# Patient Record
Sex: FEMALE | Race: AMERICAN INDIAN OR ALASKA NATIVE
[De-identification: names, ages, dates, MRNs, and addresses within clinical notes are randomized per-mention and may not be internally consistent; named-entity substitution may affect disease eponyms.]

---

## 2020-04-29 ENCOUNTER — HOSPITAL ENCOUNTER (INPATIENT)
Dept: HOSPITAL 5 - TRG | Age: 30
LOS: 1 days | Discharge: HOME | DRG: 769 | End: 2020-04-30
Attending: OBSTETRICS & GYNECOLOGY | Admitting: OBSTETRICS & GYNECOLOGY
Payer: MEDICAID

## 2020-04-29 DIAGNOSIS — E66.01: ICD-10-CM

## 2020-04-29 DIAGNOSIS — Z23: ICD-10-CM

## 2020-04-29 DIAGNOSIS — D64.9: ICD-10-CM

## 2020-04-29 DIAGNOSIS — Z3A.21: ICD-10-CM

## 2020-04-29 LAB
BACTERIA #/AREA URNS HPF: (no result) /HPF
BASOPHILS # (AUTO): 0.1 K/MM3 (ref 0–0.1)
BASOPHILS NFR BLD AUTO: 0.4 % (ref 0–1.8)
BILIRUB UR QL STRIP: (no result)
BLOOD UR QL VISUAL: (no result)
EOSINOPHIL # BLD AUTO: 0.1 K/MM3 (ref 0–0.4)
EOSINOPHIL NFR BLD AUTO: 0.8 % (ref 0–4.3)
HCT VFR BLD CALC: 25.9 % (ref 30.3–42.9)
HCT VFR BLD CALC: 26.4 % (ref 30.3–42.9)
HCT VFR BLD CALC: 26.8 % (ref 30.3–42.9)
HCT VFR BLD CALC: 36.7 % (ref 30.3–42.9)
HGB BLD-MCNC: 11.8 GM/DL (ref 10.1–14.3)
HGB BLD-MCNC: 8.1 GM/DL (ref 10.1–14.3)
HGB BLD-MCNC: 8.3 GM/DL (ref 10.1–14.3)
HGB BLD-MCNC: 8.6 GM/DL (ref 10.1–14.3)
LYMPHOCYTES # BLD AUTO: 1.9 K/MM3 (ref 1.2–5.4)
LYMPHOCYTES NFR BLD AUTO: 16 % (ref 13.4–35)
MCHC RBC AUTO-ENTMCNC: 32 % (ref 30–34)
MCV RBC AUTO: 73 FL (ref 79–97)
MONOCYTES # (AUTO): 1.2 K/MM3 (ref 0–0.8)
MONOCYTES % (AUTO): 10.1 % (ref 0–7.3)
MUCOUS THREADS #/AREA URNS HPF: (no result) /HPF
PH UR STRIP: 7 [PH] (ref 5–7)
PLATELET # BLD: 221 K/MM3 (ref 140–440)
PROT UR STRIP-MCNC: (no result) MG/DL
RBC # BLD AUTO: 5.04 M/MM3 (ref 3.65–5.03)
RBC #/AREA URNS HPF: 1 /HPF (ref 0–6)
UROBILINOGEN UR-MCNC: < 2 MG/DL (ref ?–2)
WBC #/AREA URNS HPF: 1 /HPF (ref 0–6)

## 2020-04-29 PROCEDURE — 86900 BLOOD TYPING SEROLOGIC ABO: CPT

## 2020-04-29 PROCEDURE — 90715 TDAP VACCINE 7 YRS/> IM: CPT

## 2020-04-29 PROCEDURE — 36415 COLL VENOUS BLD VENIPUNCTURE: CPT

## 2020-04-29 PROCEDURE — P9016 RBC LEUKOCYTES REDUCED: HCPCS

## 2020-04-29 PROCEDURE — 10D17Z9 MANUAL EXTRACTION OF PRODUCTS OF CONCEPTION, RETAINED, VIA NATURAL OR ARTIFICIAL OPENING: ICD-10-PCS | Performed by: OBSTETRICS & GYNECOLOGY

## 2020-04-29 PROCEDURE — 85025 COMPLETE CBC W/AUTO DIFF WBC: CPT

## 2020-04-29 PROCEDURE — 86850 RBC ANTIBODY SCREEN: CPT

## 2020-04-29 PROCEDURE — 59025 FETAL NON-STRESS TEST: CPT

## 2020-04-29 PROCEDURE — 85018 HEMOGLOBIN: CPT

## 2020-04-29 PROCEDURE — 88305 TISSUE EXAM BY PATHOLOGIST: CPT

## 2020-04-29 PROCEDURE — 86920 COMPATIBILITY TEST SPIN: CPT

## 2020-04-29 PROCEDURE — 85014 HEMATOCRIT: CPT

## 2020-04-29 PROCEDURE — 81001 URINALYSIS AUTO W/SCOPE: CPT

## 2020-04-29 PROCEDURE — 30233N1 TRANSFUSION OF NONAUTOLOGOUS RED BLOOD CELLS INTO PERIPHERAL VEIN, PERCUTANEOUS APPROACH: ICD-10-PCS | Performed by: OBSTETRICS & GYNECOLOGY

## 2020-04-29 PROCEDURE — 86901 BLOOD TYPING SEROLOGIC RH(D): CPT

## 2020-04-29 RX ADMIN — IBUPROFEN SCH MG: 600 TABLET, FILM COATED ORAL at 23:22

## 2020-04-29 NOTE — EMERGENCY DEPARTMENT REPORT
ED Pregnancy HPI





- General


Chief complaint: Abdominal Pain


Stated complaint: PREGNANCY PAIN


Time Seen by Provider: 20 01:16


Source: patient


Mode of arrival: Ambulatory


Limitations: No Limitations





- History of Present Illness


Initial comments: 





Patient is a 29-year-old female that presents emergency room with complaints of 

lower abdominal pain.  Patient states it is lower and suprapubic.  Patient 

states it is a cramping sensation.  Patient states the pain is intermittent.  

Patient states when it is on it is a 9 out of 10.  Patient states the pain is 

worse when she lays down.  Patient states she is 20 weeks and 1 day pregnant.  

Patient states she is due September 15, 2020.  Patient states the symptoms been 

going on for 2 days.  Patient states she called her OB/GYN they said it was 

normal.  Patient states that she was sexually active 4 days ago.  Patient denies

dysuria.  Patient denies vaginal bleeding.  Patient denies loss of fluid per [facility].











Patient denies recent travel.  Patient denies recent international travel.  

Patient denies exposure to the novel coronavirus.  Patient denies sick contacts.

 Patient denies fever and chills.  Patient denies cough.  Patient denies 

diarrhea.  Patient denies coming in contact with anybody with symptoms of the 

novel coronavirus.


MD Complaint: abdominal pain, "contractions"


-: Sudden, days(s)


Location: abdomen


Radiation: suprapubic


Severity: severe


Severity scale (0 -10): 9


Quality: cramping


Consistency: intermittent


Improves with: none


Worsens with: none


Associated symptoms: denies: nausea/vomiting, vaginal bleeding, vaginal 

discharge, abdominal pain, headache, vision changes, malaise, dysparuenia, rash,

seizure, shortness of breath, syncope, weakness


Vaginal bleeding: none


Pregnant:: Yes


Number of weeks pregnant: 20


OB History - Current Pregnancy: no complications


Pre-car[facility] care: followed by OB





- Related Data


: 1


Para: 0


                                    Allergies











Allergy/AdvReac Type Severity Reaction Status Date / Time


 


nickel Allergy  Rash Verified 20 01:09














ED Review of Systems


ROS: 


Stated complaint: PREGNANCY PAIN


Other details as noted in HPI





Constitutional: denies: chills, fever


Eyes: denies: eye pain, eye discharge, vision change


ENT: denies: ear pain, throat pain


Respiratory: denies: cough, shortness of breath, wheezing


Cardiovascular: denies: chest pain, palpitations


Endocrine: no symptoms reported


Gastrointestinal: abdominal pain.  denies: nausea, diarrhea


Genitourinary: denies: urgency, dysuria, discharge


Musculoskeletal: denies: back pain, joint swelling, arthralgia


Skin: denies: rash, lesions


Neurological: denies: headache, weakness, paresthesias


Psychiatric: denies: anxiety, depression


Hematological/Lymphatic: denies: easy bleeding, easy bruising





ED Past Medical Hx





- Past Medical History


Previous Medical History?: Yes


Hx Diabetes: Yes (poss gestaional diabetes)





- Surgical History


Past Surgical History?: No





- Social History


Smoking Status: Never Smoker


Substance Use Type: None





ED Physical Exam





- General


Limitations: No Limitations


General appearance: alert, in no apparent distress





- Head


Head exam: Present: atraumatic, normocephalic





- Eye


Eye exam: Present: normal appearance





- ENT


ENT exam: Present: mucous membranes moist





- Neck


Neck exam: Present: normal inspection





- Respiratory


Respiratory exam: Present: normal lung sounds bilaterally.  Absent: respiratory 

distress





- Cardiovascular


Cardiovascular Exam: Present: regular rate, normal rhythm.  Absent: systolic 

murmur, diastolic murmur, rubs, gallop





- GI/Abdominal


GI/Abdominal exam: Present: soft, distended, normal bowel sounds.  Absent: 

tenderness, guarding





- Extremities Exam


Extremities exam: Present: normal inspection





- Back Exam


Back exam: Present: normal inspection





- Neurological Exam


Neurological exam: Present: alert, oriented X3





- Psychiatric


Psychiatric exam: Present: normal affect, normal mood





- Skin


Skin exam: Present: warm, dry, intact, normal color.  Absent: rash





ED Course


                                   Vital Signs











  20





  01:09


 


Temperature 98.4 F


 


Pulse Rate 91 H


 


Respiratory 18





Rate 


 


Blood Pressure 163/93


 


O2 Sat by Pulse 96





Oximetry 














- Reevaluation(s)


Reevaluation #1: 


Patient is cleared from emergency room standpoint and will be sent up to mother-

baby to monitor the baby and check for contractions.


20 01:26








I discussed all  clinical findings with patient.  I discussed plan of care with 

patient.  Patient agrees with plan of care.  Patient is stable for discharge.  

Patient will be discharged home but patient to go to the mother-baby to be 

cleared from a obstetric standpoint.  Patient given discharge instructions.  

Patient voiced understanding of discharge instructions.


20 01:33








ED Medical Decision Making





- Lab Data


Result diagrams: 


                                 20 03:28








- Medical Decision Making





Patient is a 29-year-old female that presents emergency room with complaints of 

lower abdominal contractions and cramps.  Patient medically cleared from the 

standpoint of the ER but patient will require further evaluation and mother-baby

since the patient is 20 weeks pregnant.  Patient discharged from the ER sent 

directly to mother-baby for evaluation.





- Differential Diagnosis


Term labor, contractions, abdominal pain


Critical care attestation.: 


If time is entered above; I have spent that time in minutes in the direct care 

of this critically ill patient, excluding procedure time.








ED Disposition


Clinical Impression: 


 Abdominal cramping affecting pregnancy





Disposition: DC-01 TO HOME OR SELFCARE


Is pt being admited?: No


Does the pt Need Aspirin: No


Condition: Stable


Time of Disposition: 01:33

## 2020-04-29 NOTE — OPERATIVE REPORT
Operative Report


Operative Report: 


Date of procedure: 2020 


Preoperative diagnosis:


1) s/p  Delivery 


2) Postpartum Hemorrhage


3) Retained Placenta  


4) Morbid Obesity


Postoperative diagnosis: Same


Procedure:


1)Manual Removal of Placenta


2)Suction Dilation and Curettage 


Surgeon: Katherine Carranza MD


EBL: 2000 mL


Findings:


1) Cervix dilated with placenta coming through external os 


Specimens: placenta to pathology 


Drains: None 


Complications: None. Counts correct x 2


Disposition: Stable to PACU





Indication for procedure:


The patient is a 29 year old  at 20w1d who had a  delivery 

followed by postpartum hemorrhage and retained placenta. At this time the 

decision was made to proceed to the OR for suction dilation and curettage.





Operation in detail:


After the risks, benefits alternatives and complications were explained to the 

patient, she gave informed consent for the procedure. She was subsequently taken

to the operating room and placed in the dorsal supine position. She was given 

Ancef 2g prior to the start of the procedure. General anesthesia was then 

induced without difficulty. The patient was then placed in the high lithotomy 

position in Shahid stirrups and prepped and draped in a normal sterile fashion. A

time out was then performed. 





A catheter was used to drain the bladder of 100 mL of clear urine. The vagina 

and perineum were inspected and no lacerations were noted. The placenta was 

noted to be partially extruding into the vagina. The placenta was grasped with a

ring forceps and delivered. A suction curettage was performed with a number 14 

then a number 12 cannula and the products of conception were sent to pathology. 

A sharp curettage confirmed all quadrants gritty. 600 micrograms of misoprostol 

were placed per rectum. At this time the procedure was ended. The patient was 

replaced in the dorsal supine position. She was then extubated without 

difficulty. She was subsequently taken to the PACU in stable condition. The 

patient tolerated the procedure well. All instrument and lap counts were correct

x 2.

## 2020-04-29 NOTE — PROCEDURE NOTE
OB Delivery Note





- Delivery


Date of Delivery: 20


Surgeon: ELY GIPSON


Estimated blood loss: 500cc





- Vaginal


Delivery presentation: vertex


Intrapartum events: precipitous labor- <3hr, postpartum hemorrhage


Delivery induction: none


Delivery monitor: external FHT, external uterine


Route of delivery: 


Delivery placenta: other (retained placenta )


Episiotomy: none


Delivery laceration: none


Anesthesia: none


Delivery comments: 


During the patient's evaluation in triage, she noted abdominal pain and rectal 

pressure while up to the bathroom. Shortly thereafter, a non-viable fetus 

delivered via spontaneous vaginal delivery while pt was in the bathroom as on 

call-provider was en route. She was placed on a stretcher and taken to a labor 

and delivery room. Upon arrival of on-call physician, pt was lying in bed, with 

some blood on her peripad and placenta was noted to be in situ. She was given 

800 mcg of misoprostol vaginally to aid in delivery of the placenta. She passed 

a large blood clot within 5 minutes of administration with 3 pills (600 mcg) 

visible in the clot.  Subsequently the patient received an additional 600 mcg of

misoprostol vaginally but she began to bleed heavily afterwards without delivery

of the placenta and she became hypotensive and tachycardic. The decision was 

made to proceed to the operating room for suction dilation and curettage and 

other indicated procedures.





- Infant


  ** A


Apgar at 1 minute: 3


Infant Gender: Male (weight pending)

## 2020-04-29 NOTE — ANESTHESIA CONSULTATION
Anesthesia Consult and Med Hx


Date of service: 04/29/20





- Airway


Anesthetic Teeth Evaluation: Poor


ROM Head & Neck: Adequate


Mental/Hyoid Distance: Adequate


Mallampati Class: Class III


Intubation Access Assessment: Probably Good





- Pulmonary Exam


CTA: Yes





- Cardiac Exam


Cardiac Exam: RRR





- Pre-Operative Health Status


ASA Pre-Surgery Classification: ASA3, Emergency


Proposed Anesthetic Plan: MAC





- Pulmonary


Hx Smoking: No


Hx Asthma: No


Hx Respiratory Symptoms: No


SOB: No


COPD: No


Home Oxygen Therapy: No


Hx Pneumonia: No


Hx Sleep Apnea: No





- Cardiovascular System


Hx Hypertension: No


Hx Coronary Artery Disease: No


Hx Heart Attack/AMI: No


Hx Angina: No


Hx Percutaneous Transluminal Coronary Angioplasty (PTCA): No


Hx Cardia Arrhythmia: No


Hx Pacemaker: No


Hx Internal Defibrillator: No


Hx Valvular Heart Disease: No


Hx Heart Murmur: No


Hx Peripheral Vascular Disease: No





- Central Nervous System


Hx Neuromuscular Disorder: No


Hx Seizures: No


CVA: No


Hx Back Pain: No


Hx Psychiatric Problems: No





- Gastrointestinal


Hx Ulcer: No


Hx Gastroesophageal Reflux Disease: No





- Endocrine


Hx Renal Disease: No


Hx End Stage Renal Disease: No


Hx Cirrhosis: No


Hx Liver Disease: No


Hx Insulin Dependent Diabetes: No


Hx Non-Insulin Dependent Diabetes: No


Hx Thyroid Disease: No


Hx Hypothyroidism: No


Hx Hyperthyroidism: No





- Hematic


Hx Anemia: No


Hx Sickle Cell Disease: No





- Other Systems


Hx Alcohol Use: No


Hx Substance Use: No


Hx Cancer: No


Hx Obesity: Yes

## 2020-04-29 NOTE — ANESTHESIA DAY OF SURGERY
Anesthesia Day of Surgery





- Day of Surgery


Patient Examined: Yes


Patient H&P Reviewed: Yes


Patient is NPO: Yes


Beta Blockers: No


Cardiac Clearance: No


Pulmonary Clearance: No


Shahid's Test: N/A

## 2020-04-29 NOTE — HISTORY AND PHYSICAL REPORT
History of Present Illness


Date of examination: 20


Date of admission: 


20 03:11





Chief complaint: 


abdominal pain 


History of present illness: 





Pt is a 29 year old -American  PHILIPP 9/15/20 at 20w1d who presents 

with abdominal pain for the last two days. She initially called her Ob/Gyn 

providers who told her she may be dehydrated. She reports the pain continuing 

and being more consistent this morning so she presented to the hospital 

evaluated. She denies any vaginal bleeding or leakage of fluid. She has had 

prenatal care at [facility] which she reports has been complicated by 

elevated early one hour glucose screen and chronic hypertension. She has been 

taking low dose aspirin daily. Patient does report a sensitivity to latex. 





While being evaluated in triage, the patient reported a sharp pain and rectal 

pressure, then delivered a non-viable male fetus while the provider was en-

route. Please see delivery note. 





Past History


Past Medical History: hypertension (chronic hypertension, no meds )


Past Surgical History: other (wisdom teeth removal )


Family/Genetic History: none


Social history: no significant social history





- Obstetrical History


Expected Date of Delivery: 09/15/20


Actual Gestation: 20 Week(s) 1 Day(s) 


: 2


Para: 0


Hx # Term Pregnancies: 0


Number of  Pregnancies: 0


Spontaneous Abortions: 0


Induced : 1


Number of Living Children: 0





Medications and Allergies


                                    Allergies











Allergy/AdvReac Type Severity Reaction Status Date / Time


 


nickel Allergy  Rash Verified 20 01:09











Active Meds: 


Active Medications





Ephedrine Sulfate (Ephedrine Sulfate)  10 mg IV Q2M PRN


   PRN Reason: Hypotension


Fentanyl (Sublimaze)  100 mcg IV Q2H PRN


   PRN Reason: Pain,Severe (7-10) LABOR PAIN


Oxytocin/Sodium Chloride (Pitocin/Ns 20 Unit/1000ml Drip)  20 units in 1,000 mls

@ 125 mls/hr IV AS DIRECT THIEN


Lactated Ringer's (Lactated Ringers)  1,000 mls @ 125 mls/hr IV AS DIRECT THIEN


Mineral Oil (Mineral Oil)  30 ml PO QHS PRN


   PRN Reason: Constipation


Naloxone HCl (Naloxone)  0.1 mg IV Q2MIN PRN


   PRN Reason: Res Rate </= 8 or 02 SAT < 92%











Review of Systems


All systems: negative





- Vital Signs


Vital signs: 


                                   Vital Signs











Temp Pulse Resp BP Pulse Ox


 


 98.4 F   91 H  18   163/93   96 


 


 20 01:09  20 01:09  20 01:09  20 01:09  20 01:09








                                        











Temp Pulse Resp BP Pulse Ox


 


 98.9 F   72   18   136/74   96 


 


 20 02:26  20 02:45  20 02:26  20 02:45  20 01:09














- Physical Exam


Breasts: Positive: deferred


Abdomen: Positive: soft (obese )


Uterus: Positive: enlarged


Extremities: Positive: normal





Results


Result Diagrams: 


                                 20 03:28





                              Abnormal lab results











  20 Range/Units





  03:28 


 


WBC  12.1 H  (4.5-11.0)  K/mm3


 


RBC  5.04 H  (3.65-5.03)  M/mm3


 


MCV  73 L  (79-97)  fl


 


MCH  23 L  (28-32)  pg


 


Mono % (Auto)  10.1 H  (0.0-7.3)  %


 


Mono #  1.2 H  (0.0-0.8)  K/mm3


 


Seg Neutrophils %  72.7 H  (40.0-70.0)  %


 


Seg Neutrophils #  8.8 H  (1.8-7.7)  K/mm3








All other labs normal.








Assessment and Plan





A: IUP at 20w1d


    s/p  delivery


    Chronic Hypertension


    Placenta in situ 


    Morbid Obesity


    Glucose Intolerance 





P: Admit to labor and delivery


    CBC, Type and Screen 


    Closely monitor clinical status

## 2020-04-30 VITALS — SYSTOLIC BLOOD PRESSURE: 125 MMHG | DIASTOLIC BLOOD PRESSURE: 58 MMHG

## 2020-04-30 PROCEDURE — 3E0234Z INTRODUCTION OF SERUM, TOXOID AND VACCINE INTO MUSCLE, PERCUTANEOUS APPROACH: ICD-10-PCS | Performed by: OBSTETRICS & GYNECOLOGY

## 2020-04-30 RX ADMIN — IBUPROFEN SCH MG: 600 TABLET, FILM COATED ORAL at 05:27

## 2020-04-30 NOTE — PROGRESS NOTE
Assessment and Plan


A:


POD1 s/p , D&C, transfusion


Severe anemia due to pregnancy and blood loss


Chronic hypertension





P:


Ferrous sulfate supplementation


Offer grief support


Discharge to home with stable H&H


Close clinical f/u





Subjective





- Subjective


Date of service: 20


Principal diagnosis: s/p  and D&C at 20wk EGA


Interval history: 


POD1 s/p  followed by D&C at 20 weeks EGA secondary to PPROM. She has 

received 1unit PRBC. 


Patient reports: appetite normal, voiding normally, pain well controlled, 

ambulating normally


: 





Objective





- Vital Signs


Latest vital signs: 


                                   Vital Signs











  Temp Pulse Resp BP BP Pulse Ox


 


 20 04:18  98.9 F  97 H  20  146/78   97


 


 20 00:36  98.3 F  83  18  129/48   97


 


 20 20:45  97.6 F  79  18  139/56   97


 


 20 18:43  98.5 F  109 H  16   164/77  97


 


 20 17:30   122 H     98


 


 20 17:28   134 H   114/56  


 


 20 17:27   112 H   118/57  


 


 20 17:25   103 H   124/58   99


 


 20 17:21  99 F  95 H  20  124/60   99


 


 20 17:20   104 H     99


 


 20 17:15   104 H     99


 


 20 17:11   90   124/60  


 


 20 17:10   90     98


 


 20 17:05   105 H     99


 


 20 17:00   112 H     99


 


 20 16:59   103 H   123/56  


 


 20 16:58  99.4 F  101 H  18  123/56   99


 


 20 16:56   93 H   126/59  


 


 20 16:55   96 H     99


 


 20 16:50   102 H     98


 


 20 16:45   104 H     99


 


 20 16:40   105 H     98


 


 20 16:35   90     98


 


 20 16:31   93 H   125/60  


 


 20 16:30   95 H     97


 


 20 16:26   102 H   123/58  


 


 04/29/20 16:25   100 H     97


 


 20 16:20   91 H     98


 


 20 16:15   93 H     99


 


 20 16:11   87   127/55  


 


 20 16:10   91 H     97


 


 20 16:05   84     100


 


 20 16:00   93 H     99


 


 20 15:55  99.4 F  106 H  18  125/60   97


 


 20 15:54  99.8 F H  112 H  20  141/67   98


 


 20 15:50   108 H     99


 


 20 15:45   105 H     99


 


 20 15:41  99.6 F  113 H  20  131/64   99


 


 20 15:40   112 H     99


 


 20 15:35   80     98


 


 20 15:30   88     100


 


 20 15:28   93 H   132/58  


 


 20 15:25  99.2 F  84  18  132/58   98


 


 20 15:20   78     100


 


 20 15:15   88     100


 


 20 15:10  98.6 F  102 H  20  121/60   98


 


 20 15:05   96 H     100


 


 20 15:00   114 H     99


 


 20 14:55   115 H     99


 


 20 14:50   105 H     99


 


 20 14:45   111 H     98


 


 20 14:40   104 H     99


 


 20 14:35   105 H   121/60   98


 


 20 14:30   117 H     99


 


 20 14:25   114 H     100


 


 20 14:20   94 H     98


 


 20 14:15   91 H     100


 


 20 14:10   92 H     98


 


 20 14:05   103 H     99


 


 20 14:00   100 H     99


 


 20 13:55   97 H   115/56   100


 


 20 13:50   97 H     100


 


 20 13:45   98 H     99


 


 20 13:40   102 H     99


 


 20 13:35   109 H     98


 


 20 13:30   96 H     97


 


 20 13:25   113 H   115/53   97


 


 20 13:20   108 H     99


 


 20 13:15   92 H     98


 


 20 13:10   90     98


 


 20 13:05   94 H     99


 


 20 13:00   82     99


 


 20 12:55   89     99


 


 20 12:50   102 H     99


 


 20 12:45   91 H     100


 


 20 12:40   107 H     100


 


 20 12:35   105 H     99


 


 20 12:30   105 H     99


 


 20 12:25   105 H     100


 


 20 12:20   108 H     98


 


 20 12:15   101 H     99


 


 20 12:10   120 H     99


 


 20 12:03   123 H     98


 


 20 11:58   134 H     100


 


 20 11:53   134 H   111/52   100


 


 20 11:51   116 H   118/56  


 


 20 11:50   99 H   122/57  


 


 20 11:48   105 H     99


 


 20 11:43   100 H     99


 


 20 11:40   107 H   109/55  


 


 20 11:38   105 H     98


 


 20 11:33   101 H     99


 


 20 11:28   94 H     98


 


 20 11:25   90   115/56  


 


 20 11:23  99.1 F  88     99


 


 20 11:18   90     97


 


 20 11:13   84     97


 


 20 11:10   82   113/53  


 


 20 11:08   85     97


 


 20 11:03   87     97


 


 20 10:58   91 H     98


 


 20 10:55   84   119/55  


 


 20 10:53   94 H     98


 


 20 10:48   99 H     98


 


 20 10:43   102 H     98


 


 20 10:40   85   117/57  


 


 20 10:38   92 H     98


 


 20 10:33   100 H     98


 


 20 10:28   110 H     98


 


 20 10:22   111 H     98


 


 20 10:17   103 H     98


 


 20 10:12   107 H     98


 


 20 10:07   101 H     97


 


 20 10:03   127 H   88/44  


 


 20 10:02   133 H     99


 


 20 10:01   127 H   114/57  


 


 20 09:59   122 H   115/57  


 


 20 09:58   98 H   126/59  


 


 20 09:57   103 H     99


 


 20 09:52   110 H     99


 


 20 09:49   103 H   133/62  


 


 20 09:47   101 H     99


 


 20 09:44   95 H   140/68  


 


 20 09:42   110 H     98


 


 20 09:39   108 H   143/70  


 


 20 09:37   101 H     97


 


 20 09:34   102 H   138/63  


 


 20 09:32   106 H     98


 


 20 09:29   102 H   139/62  


 


 20 09:27   114 H     99


 


 20 09:25   121 H   134/67  


 


 20 09:22   123 H     98


 


 20 09:19   94 H   119/58  


 


 20 09:17   96 H     98


 


 20 09:14   90   120/60  


 


 20 09:12   89     97


 


 20 09:09   81   125/58  


 


 20 09:07   98 H     97


 


 20 09:04   90   131/65  


 


 20 09:02   98 H     97


 


 20 08:59   97 H   129/64  


 


 20 08:57   96 H     98


 


 20 08:54   86   132/65  


 


 20 08:52   101 H     98


 


 20 08:49   121 H   146/70  


 


 20 08:47   101 H     98


 


 20 08:44   107 H   147/70  


 


 20 08:42   100 H     98


 


 20 08:39   92 H   141/63  


 


 20 08:37   108 H     98


 


 20 08:34   108 H   157/72  


 


 20 08:32   118 H     98


 


 20 08:29   96 H   129/62  


 


 20 08:27   98 H     97


 


 20 08:24   98 H   142/69  


 


 20 08:22   86     98


 


 20 08:19   112 H   142/67  


 


 20 08:17   104 H     98


 


 20 08:14   100 H   130/63  








                                Intake and Output











 20





 23:59 07:59 15:59


 


Intake Total 490 240 


 


Output Total 450 400 


 


Balance 40 -160 


 


Intake:   


 


  Intake, Free Water 240 240 


 


  Blood Product 250  


 


    Leukoreduced Red Blood 250  





    Cells  Unit [id number]   


 


Output:   


 


  Urine 450 400 


 


    Void 450 400 


 


Other:   


 


  Total, Output Amount 150 400 


 


  # Voids   


 


    Void  1 














- Exam


Lungs: Present: Normal air movement


Abdomen: Present: soft.  Absent: distention


Uterus: Present: firm, fundal height below umbilicus.  Absent: bogginess


Extremities: Present: normal





- Labs


Labs: 


                              Abnormal lab results











  20 Range/Units





  03:28 10:29 18:55 


 


Hgb   8.3 L  8.6 L  (10.1-14.3)  gm/dl


 


Hct   26.4 L  26.8 L  (30.3-42.9)  %


 


Crossmatch  See Detail

## 2020-04-30 NOTE — DISCHARGE SUMMARY
Providers





- Providers


Date of Admission: 


20 03:11





Date of discharge: 20


Attending physician: 


ELY CARRANZA





Primary care physician: 


PRIMARY CARE MD








Hospitalization


Reason for admission: IUP - ,  labor


Delivery: 


Episiotomy: none


Laceration: none


Other postpartum procedures: curettage


Postpartum complications: transfusion


Discharge diagnosis: intrapartum fetal demise,  delivery


Hospital course: 


Pt arrived reporting abdominal pain and delivered a non-viable fetus at 20 weeks

EGA in triage. She sustained a postpartum hemorrhage and underwent a suction D&C

for retained placenta. She received 1 unit PRBC. She was found to have 

occasional elevation in BP. She met discharge criteria on POD1.


Condition at discharge: Stable


Disposition: DC-01 TO HOME OR SELFCARE





Plan





- Discharge Medications


Prescriptions: 


Ferrous Sulfate [Feosol 325 MG tab] 325 mg PO BID #60 tablet


Ibuprofen [Motrin] 600 mg PO Q6H PRN #60 tablet


 PRN Reason: Pain





- Provider Discharge Summary


Activity: routine, no sex for 6 weeks, no heavy lifting 4 weeks, no strenuous 

exercise


Diet: routine


Instructions: routine


Additional instructions: 


[]  Smoking cessation referral if applicable(refer to patient education folder 

for contact #)


[]  Refer to [facility]'s [facility] Booklet








Call your doctor immediately for:


* Fever > 100.5


* Heavy vaginal bleeding ( >1 pad per hour)


* Severe persistent headache


* Shortness of breath


* Reddened, hot, painful area to leg or breast


* Drainage or odor from incision.





* Keep incision clean and dry at all times and follow doctor's instructions 

regarding bathing/showering











- Follow up plan


Follow up: 


ELY CARRANZA MD [Staff Physician] - 10 Days (Please call [facility] 

to schedule appointment with Dr. Carranza.)

## 2021-09-03 ENCOUNTER — HOSPITAL ENCOUNTER (EMERGENCY)
Dept: HOSPITAL 5 - ED | Age: 31
Discharge: LEFT BEFORE DISCHARGE | End: 2021-09-03
Payer: MEDICAID

## 2021-09-03 VITALS — SYSTOLIC BLOOD PRESSURE: 176 MMHG | DIASTOLIC BLOOD PRESSURE: 90 MMHG

## 2021-09-03 DIAGNOSIS — Z53.21: ICD-10-CM

## 2021-09-03 DIAGNOSIS — R94.31: Primary | ICD-10-CM

## 2022-08-11 ENCOUNTER — HOSPITAL ENCOUNTER (EMERGENCY)
Dept: HOSPITAL 5 - ED | Age: 32
Discharge: HOME | End: 2022-08-11
Payer: MEDICAID

## 2022-08-11 VITALS — SYSTOLIC BLOOD PRESSURE: 126 MMHG | DIASTOLIC BLOOD PRESSURE: 61 MMHG

## 2022-08-11 DIAGNOSIS — N93.9: Primary | ICD-10-CM

## 2022-08-11 DIAGNOSIS — Z91.09: ICD-10-CM

## 2022-08-11 DIAGNOSIS — E11.9: ICD-10-CM

## 2022-08-11 LAB
ALBUMIN SERPL-MCNC: 3.8 G/DL (ref 3.9–5)
ALT SERPL-CCNC: 21 UNITS/L (ref 7–56)
BACTERIA #/AREA URNS HPF: (no result) /HPF
BASOPHILS # (AUTO): 0 K/MM3 (ref 0–0.1)
BASOPHILS NFR BLD AUTO: 0.3 % (ref 0–1.8)
BILIRUB UR QL STRIP: (no result)
BLOOD UR QL VISUAL: (no result)
BUN SERPL-MCNC: 14 MG/DL (ref 7–17)
BUN/CREAT SERPL: 18 %
CALCIUM SERPL-MCNC: 8.6 MG/DL (ref 8.4–10.2)
CAOX CRY #/AREA URNS HPF: (no result) /[HPF]
EOSINOPHIL # BLD AUTO: 0.1 K/MM3 (ref 0–0.4)
EOSINOPHIL NFR BLD AUTO: 1.2 % (ref 0–4.3)
HCT VFR BLD CALC: 19.2 % (ref 30.3–42.9)
HEMOLYSIS INDEX: 0
HGB BLD-MCNC: 5.8 GM/DL (ref 10.1–14.3)
INR PPP: 0.92 (ref 0.87–1.13)
LYMPHOCYTES # BLD AUTO: 1.8 K/MM3 (ref 1.2–5.4)
LYMPHOCYTES NFR BLD AUTO: 20.4 % (ref 13.4–35)
MCHC RBC AUTO-ENTMCNC: 31 % (ref 30–34)
MCV RBC AUTO: 67 FL (ref 79–97)
MONOCYTES # (AUTO): 0.7 K/MM3 (ref 0–0.8)
MONOCYTES % (AUTO): 7.7 % (ref 0–7.3)
MUCOUS THREADS #/AREA URNS HPF: (no result) /HPF
PH UR STRIP: 6 [PH] (ref 5–7)
PLATELET # BLD: 237 K/MM3 (ref 140–440)
RBC # BLD AUTO: 2.88 M/MM3 (ref 3.65–5.03)
RBC #/AREA URNS HPF: > 182 /HPF (ref 0–6)
RENAL EPI CELLS #/AREA URNS LPF: <1 /LPF
UROBILINOGEN UR-MCNC: 0 MG/DL (ref ?–2)
WBC #/AREA URNS HPF: 3 /HPF (ref 0–6)

## 2022-08-11 PROCEDURE — 36415 COLL VENOUS BLD VENIPUNCTURE: CPT

## 2022-08-11 PROCEDURE — 86901 BLOOD TYPING SEROLOGIC RH(D): CPT

## 2022-08-11 PROCEDURE — 81001 URINALYSIS AUTO W/SCOPE: CPT

## 2022-08-11 PROCEDURE — 36430 TRANSFUSION BLD/BLD COMPNT: CPT

## 2022-08-11 PROCEDURE — 86920 COMPATIBILITY TEST SPIN: CPT

## 2022-08-11 PROCEDURE — 85025 COMPLETE CBC W/AUTO DIFF WBC: CPT

## 2022-08-11 PROCEDURE — 86850 RBC ANTIBODY SCREEN: CPT

## 2022-08-11 PROCEDURE — 80053 COMPREHEN METABOLIC PANEL: CPT

## 2022-08-11 PROCEDURE — 84703 CHORIONIC GONADOTROPIN ASSAY: CPT

## 2022-08-11 PROCEDURE — 81025 URINE PREGNANCY TEST: CPT

## 2022-08-11 PROCEDURE — 86900 BLOOD TYPING SEROLOGIC ABO: CPT

## 2022-08-11 PROCEDURE — 99284 EMERGENCY DEPT VISIT MOD MDM: CPT

## 2022-08-11 PROCEDURE — 76856 US EXAM PELVIC COMPLETE: CPT

## 2022-08-11 PROCEDURE — 76830 TRANSVAGINAL US NON-OB: CPT

## 2022-08-11 PROCEDURE — 85610 PROTHROMBIN TIME: CPT

## 2022-08-11 PROCEDURE — 96360 HYDRATION IV INFUSION INIT: CPT

## 2022-08-11 PROCEDURE — 93005 ELECTROCARDIOGRAM TRACING: CPT

## 2022-08-11 PROCEDURE — P9016 RBC LEUKOCYTES REDUCED: HCPCS

## 2022-08-11 NOTE — ULTRASOUND REPORT
ULTRASOUND TRANSVAGINAL



INDICATION / CLINICAL INFORMATION:   vaginal bleeding.



TECHNIQUE:  Transvaginal.

Duplex Color Doppler used: Yes. 



COMPARISON:  None available



FINDINGS:

UTERUS: Present.  

- Appearance (if present): No significant abnormality.

- Size in cm (if present): 8 x 4 x 6. 

- Endometrial Complex (if present): No significant abnormality. Thickness in cm (if measured) = 0.3

- Mass lesions: An approximate 2.5 cm myometrial mass consistent with a fibroid is identified in the 
left side of the uterine fundus

- Additional findings: None.



RIGHT ADNEXA: No significant ovarian cyst or mass. Normal color Doppler blood flow.



LEFT ADNEXA: The left ovary is not visualized. No obvious abnormality in the left adnexa.



URINARY BLADDER: No significant abnormality. 

FREE FLUID: None.

ADDITIONAL FINDINGS: None.



IMPRESSION:

 Mild uterine fibroid disease as described.

The left ovary is not visualized.



Signer Name: Alden Mulligan Jr, MD 

Signed: 8/11/2022 1:52 PM

Workstation Name: [id number]

## 2022-08-11 NOTE — EMERGENCY DEPARTMENT REPORT
<MELI PEREZ - Last Filed: 08/11/22 20:10>





ED General Adult HPI





- General


Chief complaint: Vaginal Bleeding


Stated complaint: HEAVY MENSTRUAL BLEEDING


Time Seen by Provider: 08/11/22 10:01





- Related Data


                                  Previous Rx's











 Medication  Instructions  Recorded  Last Taken  Type


 


Ferrous Sulfate [Feosol 325 MG tab] 325 mg PO BID #60 tablet 04/30/20 Unknown Rx


 


Ibuprofen [Motrin] 600 mg PO Q6H PRN #60 tablet 04/30/20 Unknown Rx











                                    Allergies











Allergy/AdvReac Type Severity Reaction Status Date / Time


 


nickel Allergy  Rash Verified 04/29/20 01:09














ED Past Medical Hx





- Medications


Home Medications: 


                                Home Medications











 Medication  Instructions  Recorded  Confirmed  Last Taken  Type


 


Ferrous Sulfate [Feosol 325 MG tab] 325 mg PO BID #60 tablet 04/30/20  Unknown 

Rx


 


Ibuprofen [Motrin] 600 mg PO Q6H PRN #60 tablet 04/30/20  Unknown Rx














ED Medical Decision Making





- Lab Data


Result diagrams: 


                                 08/11/22 09:15





                                 08/11/22 09:15





ED Disposition


Clinical Impression: 


 Anemia, Vaginal bleeding





Disposition: 01 HOME / SELF CARE / HOMELESS


Condition: Stable


Instructions:  Abnormal Uterine Bleeding


Referrals: 


ALEX TUCKER MD [Staff Physician] - 3-5 Days


Forms:  Work/School Release Form(ED)





<ZAY DOUGLASS - Last Filed: 08/12/22 21:15>





ED General Adult HPI





- General


PUI?: No


Source: patient


Mode of arrival: Ambulatory


Limitations: No Limitations





- History of Present Illness


Initial comments: 





abnormal vaginal bleeding /saturated 5 super pads per day .C/o irrgular bleeding




-: Gradual


Radiation: non-radiation


Severity scale (0 -10): 0


Consistency: intermittent


Improves with: none





ED Review of Systems


ROS: 


Stated complaint: HEAVY MENSTRUAL BLEEDING


Other details as noted in HPI





Constitutional: denies: chills, fever


Eyes: denies: eye pain, eye discharge, vision change


ENT: denies: ear pain, throat pain


Respiratory: denies: cough, shortness of breath, wheezing


Cardiovascular: denies: chest pain, palpitations


Endocrine: no symptoms reported


Gastrointestinal: denies: abdominal pain, nausea, diarrhea


Genitourinary: denies: urgency, dysuria, discharge


Musculoskeletal: denies: back pain, joint swelling, arthralgia


Skin: denies: rash, lesions


Neurological: denies: headache, weakness, paresthesias


Psychiatric: denies: anxiety, depression


Hematological/Lymphatic: denies: easy bleeding, easy bruising





ED Past Medical Hx





- Past Medical History


Hx Hypertension: No


Hx Heart Attack/AMI: No


Hx Congestive Heart Failure: No


Hx Diabetes: Yes (poss gestaional diabetes)


Hx Deep Vein Thrombosis: No


Hx Liver Disease: No


Hx Renal Disease: No


Hx Sickle Cell Disease: No


Hx Seizures: No


Hx Asthma: No


Hx COPD: No


Hx HIV: No


Additional medical history: fibroids





- Surgical History


Hx Pacemaker: No


Hx Internal Defibrillator: No





- Social History


Smoking Status: Never Smoker


Substance Use Type: None





ED Physical Exam





- General


Limitations: No Limitations


General appearance: alert, in no apparent distress





- Head


Head exam: Present: atraumatic, normocephalic





- Eye


Eye exam: Present: normal appearance





- ENT


ENT exam: Present: mucous membranes moist





- Neck


Neck exam: Present: normal inspection





- Respiratory


Respiratory exam: Present: normal lung sounds bilaterally.  Absent: respiratory 

distress





- Cardiovascular


Cardiovascular Exam: Present: regular rate, normal rhythm.  Absent: systolic 

murmur, diastolic murmur, rubs, gallop





- GI/Abdominal


GI/Abdominal exam: Present: soft, normal bowel sounds





- Extremities Exam


Extremities exam: Present: normal inspection





- Back Exam


Back exam: Present: normal inspection





- Neurological Exam


Neurological exam: Present: alert, oriented X3





- Psychiatric


Psychiatric exam: Present: normal affect, normal mood





- Skin


Skin exam: Present: warm, dry, intact, normal color.  Absent: rash





ED Course


                                   Vital Signs











  08/11/22 08/11/22 08/11/22





  08:26 09:48 09:51


 


Temperature 98.5 F  


 


Pulse Rate 97 H 75 69


 


Respiratory 18 17 18





Rate   


 


Blood Pressure   117/53


 


Blood Pressure 168/75  





[Left]   


 


O2 Sat by Pulse 99  





Oximetry   














  08/11/22 08/11/22 08/11/22





  09:59 10:00 10:11


 


Temperature 98.6 F  


 


Pulse Rate 71 74 76


 


Respiratory 15 17 17





Rate   


 


Blood Pressure 116/53 116/53 116/53


 


Blood Pressure 116/53  





[Left]   


 


O2 Sat by Pulse 98 99 97





Oximetry   














  08/11/22 08/11/22 08/11/22





  10:20 10:30 10:41


 


Temperature   


 


Pulse Rate 73 71 73


 


Respiratory 14 18 16





Rate   


 


Blood Pressure 116/53 114/55 114/55


 


Blood Pressure   





[Left]   


 


O2 Sat by Pulse 99 100 98





Oximetry   














  08/11/22 08/11/22 08/11/22





  10:50 11:01 11:11


 


Temperature   


 


Pulse Rate 71 74 71


 


Respiratory 15 20 20





Rate   


 


Blood Pressure 114/55 114/55 127/65


 


Blood Pressure   





[Left]   


 


O2 Sat by Pulse 99 100 100





Oximetry   














  08/11/22 08/11/22 08/11/22





  11:19 11:21 11:30


 


Temperature   


 


Pulse Rate 80 77 67


 


Respiratory 21 21 14





Rate   


 


Blood Pressure  112/56 123/49


 


Blood Pressure 112/56  





[Left]   


 


O2 Sat by Pulse 100 100 100





Oximetry   














  08/11/22 08/11/22 08/11/22





  11:41 11:50 12:00


 


Temperature   


 


Pulse Rate 76 72 72


 


Respiratory 19 16 12





Rate   


 


Blood Pressure 123/49 123/49 123/49


 


Blood Pressure   





[Left]   


 


O2 Sat by Pulse 100 100 





Oximetry   














  08/11/22 08/11/22 08/11/22





  13:33 13:41 13:51


 


Temperature   


 


Pulse Rate   


 


Respiratory   





Rate   


 


Blood Pressure 123/49 123/49 127/56


 


Blood Pressure   





[Left]   


 


O2 Sat by Pulse 100 100 99





Oximetry   














  08/11/22 08/11/22 08/11/22





  14:00 14:09 14:10


 


Temperature  98.3 F 98.3 F


 


Pulse Rate  76 70


 


Respiratory  16 17





Rate   


 


Blood Pressure 127/59 117/61 120/62


 


Blood Pressure   





[Left]   


 


O2 Sat by Pulse 100 100 100





Oximetry   














  08/11/22 08/11/22 08/11/22





  14:11 14:21 14:25


 


Temperature   98.3 F


 


Pulse Rate  74 70


 


Respiratory  13 20





Rate   


 


Blood Pressure 127/59 120/62 116/59


 


Blood Pressure   





[Left]   


 


O2 Sat by Pulse 98 100 100





Oximetry   














  08/11/22 08/11/22 08/11/22





  14:30 14:41 14:51


 


Temperature   


 


Pulse Rate 71 68 72


 


Respiratory 15 20 19





Rate   


 


Blood Pressure 116/59 116/59 120/62


 


Blood Pressure   





[Left]   


 


O2 Sat by Pulse 100 99 100





Oximetry   














  08/11/22 08/11/22 08/11/22





  14:55 15:00 15:11


 


Temperature   


 


Pulse Rate 79 70 69


 


Respiratory 18 19 19





Rate   


 


Blood Pressure 120/58 120/58 120/58


 


Blood Pressure   





[Left]   


 


O2 Sat by Pulse 100 100 100





Oximetry   














  08/11/22 08/11/22 08/11/22





  15:21 15:30 15:41


 


Temperature   


 


Pulse Rate 73 75 75


 


Respiratory 21 16 19





Rate   


 


Blood Pressure 113/56 125/68 125/68


 


Blood Pressure   





[Left]   


 


O2 Sat by Pulse 100 100 100





Oximetry   














  08/11/22 08/11/22 08/11/22





  15:51 15:55 16:00


 


Temperature  98.7 F 


 


Pulse Rate 78 76 75


 


Respiratory 16 14 20





Rate   


 


Blood Pressure 131/60 127/53 121/54


 


Blood Pressure   





[Left]   


 


O2 Sat by Pulse 100 100 100





Oximetry   














  08/11/22 08/11/22 08/11/22





  16:15 16:30 16:45


 


Temperature   


 


Pulse Rate 76 74 74


 


Respiratory 19 18 19





Rate   


 


Blood Pressure 127/53 122/51 121/54


 


Blood Pressure   





[Left]   


 


O2 Sat by Pulse 100 100 100





Oximetry   














  08/11/22





  21:05


 


Temperature 98.2 F


 


Pulse Rate 72


 


Respiratory 15





Rate 


 


Blood Pressure 


 


Blood Pressure 126/61





[Left] 


 


O2 Sat by Pulse 96





Oximetry 














ED Medical Decision Making





- Lab Data


Result diagrams: 


                                 08/11/22 09:15





                                 08/11/22 09:15





- Radiology Data


Radiology results: report reviewed, image reviewed





- Medical Decision Making





work up showed low H.H , started tranfusing , spoke with jorge mcclure to send 

home after transfusion, shaq ee her on office tomorrow 


Critical care attestation.: 


If time is entered above; I have spent that time in minutes in the direct care 

of this critically ill patient, excluding procedure time.








ED Disposition


Is pt being admited?: No


Does the pt Need Aspirin: No

## 2022-08-12 NOTE — ELECTROCARDIOGRAPH REPORT
[facility]

                                       

Test Date:    2022               Test Time:    08:35:14

Pat Name:     JEFFREY PHIPPS            Department:   

Patient ID:   [id number]          Room:          

Gender:       F                        Technician:   LYNETTE

:          1990               Requested By: ED DOC

Order Number: [id number]             Reading MD:   Donavan Pa

                                 Measurements

Intervals                              Axis          

Rate:         88                       P:            30

NC:           182                      QRS:          43

QRSD:         88                       T:            -14

QT:           350                                    

QTc:          424                                    

                           Interpretive Statements

Sinus rhythm



Borderline T abnormalities, diffuse leads

No previous ECG available for comparison

Electronically Signed On 2022 8:53:19 EDT by Donavan Pa

## 2022-08-15 NOTE — ULTRASOUND REPORT
ULTRASOUND TRANSVAGINAL



INDICATION / CLINICAL INFORMATION:   vaginal bleeding.



TECHNIQUE:  Transvaginal.

Duplex Color Doppler used: Yes. 



COMPARISON:  None available



FINDINGS:

UTERUS: Present.  

- Appearance (if present): No significant abnormality.

- Size in cm (if present): 8 x 4 x 6. 

- Endometrial Complex (if present): No significant abnormality. Thickness in cm (if measured) = 0.3

- Mass lesions: An approximate 2.5 cm myometrial mass consistent with a fibroid is identified in the 
left side of the uterine fundus

- Additional findings: None.



RIGHT ADNEXA: No significant ovarian cyst or mass. Normal color Doppler blood flow.



LEFT ADNEXA: The left ovary is not visualized. No obvious abnormality in the left adnexa.



URINARY BLADDER: No significant abnormality. 

FREE FLUID: None.

ADDITIONAL FINDINGS: None.



IMPRESSION:

 Mild uterine fibroid disease as described.

The left ovary is not visualized.



Signer Name: Alden Mulligan Jr, MD 

Signed: 8/11/2022 1:52 PM

Workstation Name: [id number]